# Patient Record
Sex: MALE | Race: WHITE | Employment: FULL TIME | URBAN - METROPOLITAN AREA
[De-identification: names, ages, dates, MRNs, and addresses within clinical notes are randomized per-mention and may not be internally consistent; named-entity substitution may affect disease eponyms.]

---

## 2018-03-21 PROBLEM — E78.00 HYPERCHOLESTEREMIA: Status: ACTIVE | Noted: 2018-03-21

## 2018-03-22 ENCOUNTER — OFFICE VISIT (OUTPATIENT)
Dept: CARDIOLOGY CLINIC | Age: 63
End: 2018-03-22

## 2018-03-22 VITALS
SYSTOLIC BLOOD PRESSURE: 158 MMHG | HEIGHT: 72 IN | HEART RATE: 87 BPM | BODY MASS INDEX: 25.03 KG/M2 | RESPIRATION RATE: 18 BRPM | OXYGEN SATURATION: 98 % | DIASTOLIC BLOOD PRESSURE: 70 MMHG | WEIGHT: 184.8 LBS

## 2018-03-22 DIAGNOSIS — Z82.49 FAMILY HISTORY OF PREMATURE CAD: ICD-10-CM

## 2018-03-22 DIAGNOSIS — E78.00 HYPERCHOLESTEREMIA: Primary | ICD-10-CM

## 2018-03-22 RX ORDER — ROSUVASTATIN CALCIUM 20 MG/1
20 TABLET, COATED ORAL
Qty: 30 TAB | Refills: 11 | Status: SHIPPED | OUTPATIENT
Start: 2018-03-22 | End: 2018-05-09 | Stop reason: SDUPTHER

## 2018-03-22 RX ORDER — ASPIRIN 81 MG/1
TABLET ORAL DAILY
COMMUNITY
End: 2019-04-17 | Stop reason: ALTCHOICE

## 2018-03-22 RX ORDER — ROSUVASTATIN CALCIUM 20 MG/1
20 TABLET, COATED ORAL
COMMUNITY
End: 2018-03-22 | Stop reason: SDUPTHER

## 2018-03-22 RX ORDER — OMEPRAZOLE 20 MG/1
40 CAPSULE, DELAYED RELEASE ORAL
COMMUNITY
End: 2019-03-13 | Stop reason: SDUPTHER

## 2018-03-22 NOTE — PROGRESS NOTES
HISTORY OF PRESENT ILLNESS  Ladonna Ho is a 65 y.o. male     SUMMARY:   Problem List  Date Reviewed: 3/22/2018          Codes Class Noted    Hypercholesteremia ICD-10-CM: E78.00  ICD-9-CM: 272.0  3/21/2018    Overview Signed 3/21/2018 10:08 AM by Noelle Severs, MD     12/08 normal dobutamine cardiolyte stress test, lvef 59%                   Current Outpatient Prescriptions on File Prior to Visit   Medication Sig    pravastatin (PRAVACHOL) 40 mg tablet Take 1 Tab by mouth nightly.  hydrocortisone (ANUSOL-HC) 2.5 % rectal cream Insert  into rectum four (4) times daily.  sildenafil citrate (VIAGRA) 100 mg tablet Take 1 Tab by mouth as needed. No current facility-administered medications on file prior to visit. CARDIOLOGY STUDIES TO DATE:  12/08 normal dobutamine cardiolyte stress test, lvef 59%  3/18 calcium score 482, 77th percentile    Chief Complaint   Patient presents with    Cholesterol Problem     HPI :  Mr. Sherie Simon is a 65year-old referred by Dr. Gina Small for cardiac evaluation. His brother who has premature coronary disease is a patient of mine as well. He has white coat hypertension and a family history of two brothers with premature coronary disease. There is no history of diabetes and he has never smoked. Lipid profile earlier this month showed total cholesterol of 220, triglycerides of 113 and HDL of 55 and LDL of 143. His Pravastatin dose was doubled to 40 mg a day. He exercises on an elliptical or a bike for 30-40 minutes almost every day with no symptoms suggestive of angina or heart failure. He has some mild arthritic pain in his back and knee and occasional heartburn. His EKG today shows normal sinus rhythm with a single PVC and no significant ST-T wave changes, normal intervals.         CARDIAC ROS:   negative for chest pain, dyspnea, palpitations, syncope, orthopnea, paroxysmal nocturnal dyspnea, exertional chest pressure/discomfort, claudication, lower extremity edema    Family History   Problem Relation Age of Onset    Heart Disease Mother     Heart Disease Father     Heart Disease Brother     Heart Disease Brother        Past Medical History:   Diagnosis Date    Atypical chest pain     Hypercholesteremia     Hypertension     Reflux esophagitis        GENERAL ROS:  A comprehensive review of systems was negative except for that written in the HPI. Visit Vitals    /70 (BP 1 Location: Left arm)    Pulse 87    Resp 18    Ht 5' 11.5\" (1.816 m)    Wt 184 lb 12.8 oz (83.8 kg)    SpO2 98%    BMI 25.42 kg/m2       Wt Readings from Last 3 Encounters:   03/22/18 184 lb 12.8 oz (83.8 kg)   03/02/18 180 lb (81.6 kg)   05/10/17 180 lb (81.6 kg)            BP Readings from Last 3 Encounters:   03/22/18 158/70   03/02/18 139/80   12/29/15 139/75       PHYSICAL EXAM  General appearance: alert, cooperative, no distress, appears stated age  Neurologic: Alert and oriented X 3  Neck: supple, symmetrical, trachea midline, no adenopathy, no carotid bruit and no JVD  Lungs: clear to auscultation bilaterally  Heart: regular rate and rhythm, S1, S2 normal, no murmur, click, rub or gallop  Abdomen: soft, non-tender. Bowel sounds normal. No masses,  no organomegaly  Extremities: extremities normal, atraumatic, no cyanosis or edema  Pulses: 2+ and symmetric    Lab Results   Component Value Date/Time    Cholesterol, total 220 (H) 12/29/2015 02:01 PM    Cholesterol, total 174 06/19/2013 02:41 PM    HDL Cholesterol 72 12/29/2015 02:01 PM    HDL Cholesterol 66 06/19/2013 02:41 PM    LDL, calculated 136 (H) 12/29/2015 02:01 PM    LDL, calculated 95 06/19/2013 02:41 PM    Triglyceride 59 12/29/2015 02:01 PM    Triglyceride 66 06/19/2013 02:41 PM     ASSESSMENT  Mr. Malick Richard has multiple risk factors and very abnormal calcium score. Fortunately, he exercises regularly and we talked about the importance of that going forward.   In the meantime, we are going to arrange for him to have a stress echocardiogram and I am going to put him on Crestor 20 mg a day with follow-up blood work in about six weeks aimed at lowering his LDL down to around 70 or less. We talked about symptoms that would prompt an urgent return visit here or a call to 911. current treatment plan is effective, no change in therapy  lab results and schedule of future lab studies reviewed with patient  reviewed diet, exercise and weight control    Encounter Diagnoses   Name Primary?  Hypercholesteremia Yes    Family history of premature CAD      Orders Placed This Encounter    AMB POC EKG ROUTINE W/ 12 LEADS, INTER & REP    omeprazole (PRILOSEC) 20 mg capsule       Follow-up Disposition:  Return in about 4 weeks (around 4/19/2018).     Dotty Romero MD  3/22/2018

## 2018-03-22 NOTE — MR AVS SNAPSHOT
[address] [facility] [address] [place] 
[phone] Patient: Abrahan Singleton MRN: [id number] :1955 Visit Information Date & Time Provider Department Dept. Phone Encounter #  
 3/22/2018  1:40 PM Ranjan Todd MD [facility] [phone] [id number] Follow-up Instructions Return in about 4 weeks (around 2018). Your Appointments 4/3/2018  2:00 PM  
VASCULAR TEST with VASCULAR, GERARDO [facility] ([place] SCHEDULING) Appt Note: stress echo for high cholesterol, high calcium score, family hx per Dr. Noah Luther (Dr. Noah Luther to review while pt in offc) [address] [place] [address], [address] [place] [address]  
[phone]  
  
   
 [address] [place] [address]  [address]  
  
    
 4/3/2018  2:00 PM  
STRESS ECHOCARDIOGRAMS with Abelardo Bansal [facility] ([place] SCHEDULING) Appt Note: stress echo for high cholesterol, high calcium score, family hx per Dr. Noah Luther (Dr. Noah Luther to review while pt in offc) [address] [place] [address], [address] [place] [address]  
[address] [facility]  
  
    
 3/26/2019  1:00 PM  
ESTABLISHED PATIENT with Ranjan Todd MD  
[facility] ([address] [facility]) Appt Note: 1 yr f/u per Dr. Subhash Persaud [address]  [address] [place] [address]  
[address],[facility] [address] [id number] Upcoming Health Maintenance Date Due Hepatitis C Screening 1955 DTaP/Tdap/Td series (1 - Tdap) 1977 FOBT Q 1 YEAR AGE 50-75 2006 Allergies as of 3/22/2018  Review Complete On: 3/22/2018 By: Ranjan Todd MD  
 No Known Allergies Current Immunizations  Never Reviewed No immunizations on file. Not reviewed this visit You Were Diagnosed With   
  
 Codes Comments Hypercholesteremia    -  Primary ICD-10-CM: E78.00 ICD-9-CM: 272.0 Family history of premature CAD     ICD-10-CM: Z82.49 
ICD-9-CM: V17.3 Vitals BP Pulse Resp Height(growth percentile) Weight(growth percentile) SpO2  
 158/70 (BP 1 Location: Left arm) 87 18 5' 11.5\" (1.816 m) 184 lb 12.8 oz (83.8 kg) 98% BMI Smoking Status 25.42 kg/m2 Never Smoker Vitals History BMI and BSA Data Body Mass Index Body Surface Area  
 25.42 kg/m 2 2.06 m 2 Preferred Pharmacy Pharmacy Name Phone [facility], [address] Your Updated Medication List  
  
   
This list is accurate as of 3/22/18  3:55 PM.  Always use your most recent med list.  
  
  
  
  
 aspirin delayed-release 81 mg tablet Take  by mouth daily. hydrocortisone 2.5 % rectal cream  
Commonly known as:  ANUSOL-HC Insert  into rectum four (4) times daily. pravastatin 40 mg tablet Commonly known as:  PRAVACHOL Take 1 Tab by mouth nightly. PriLOSEC 20 mg capsule Generic drug:  omeprazole Take 40 mg by mouth nightly. rosuvastatin 20 mg tablet Commonly known as:  CRESTOR Take 1 Tab by mouth nightly. sildenafil citrate 100 mg tablet Commonly known as:  VIAGRA Take 1 Tab by mouth as needed. Prescriptions Sent to Pharmacy Refills  
 rosuvastatin (CRESTOR) 20 mg tablet 11 Sig: Take 1 Tab by mouth nightly. Class: Normal  
 Pharmacy: [address] [place] [address], [address] [place] [address] Ph #: [phone] Route: Oral  
  
We Performed the Following AMB POC EKG ROUTINE W/ 12 LEADS, INTER & REP [51687 CPT(R)] LIPID PANEL [20256 CPT(R)] METABOLIC PANEL, COMPREHENSIVE [86128 CPT(R)] Follow-up Instructions Return in about 4 weeks (around 4/19/2018). To-Do List   
 03/22/2018 ECHO:  ECHO TTE STRESS EXRCSE COMP W OR WO CONTR Introducing [facility]! Dear Sue Garcia: 
Thank you for requesting a GeeYee account. Our records indicate that you already have an active GeeYee account. You can access your account anytime at [email or web address] Did you know that you can access your hospital and ER discharge instructions at any time in GeeYee? You can also review all of your test results from your hospital stay or ER visit. Additional Information If you have questions, please visit the Frequently Asked Questions section of the GeeYee website at [email or web address] Remember, GeeYee is NOT to be used for urgent needs. For medical emergencies, dial 911. Now available from your iPhone and Android! Please provide this summary of care documentation to your next provider. Your primary care clinician is listed as Nestor Zhu. If you have any questions after today's visit, please call [phone].

## 2018-04-03 ENCOUNTER — CLINICAL SUPPORT (OUTPATIENT)
Dept: CARDIOLOGY CLINIC | Age: 63
End: 2018-04-03

## 2018-04-03 DIAGNOSIS — E78.00 HYPERCHOLESTEREMIA: ICD-10-CM

## 2018-04-03 DIAGNOSIS — Z82.49 FAMILY HISTORY OF PREMATURE CAD: ICD-10-CM

## 2018-04-03 DIAGNOSIS — R93.1 ELEVATED CORONARY ARTERY CALCIUM SCORE: Primary | ICD-10-CM

## 2018-05-08 ENCOUNTER — TELEPHONE (OUTPATIENT)
Dept: CARDIOLOGY CLINIC | Age: 63
End: 2018-05-08

## 2018-05-08 NOTE — TELEPHONE ENCOUNTER
Patient is calling with a few questions regarding some testing that he is considering.  Patient stated that he has some questions regarding whether or not he needs to see a pulmonologist.  Phone [phone]  Marylou Jimenez

## 2018-05-09 ENCOUNTER — TELEPHONE (OUTPATIENT)
Dept: CARDIOLOGY CLINIC | Age: 63
End: 2018-05-09

## 2018-05-09 DIAGNOSIS — E78.00 HYPERCHOLESTEREMIA: ICD-10-CM

## 2018-05-09 DIAGNOSIS — E78.00 HYPERCHOLESTEREMIA: Primary | ICD-10-CM

## 2018-05-09 DIAGNOSIS — Z82.49 FAMILY HISTORY OF PREMATURE CAD: ICD-10-CM

## 2018-05-09 LAB
ALBUMIN SERPL-MCNC: 4.3 G/DL (ref 3.6–4.8)
ALBUMIN/GLOB SERPL: 1.8 {RATIO} (ref 1.2–2.2)
ALP SERPL-CCNC: 48 IU/L (ref 39–117)
ALT SERPL-CCNC: 24 IU/L (ref 0–44)
AST SERPL-CCNC: 25 IU/L (ref 0–40)
BILIRUB SERPL-MCNC: 0.5 MG/DL (ref 0–1.2)
BUN SERPL-MCNC: 16 MG/DL (ref 8–27)
BUN/CREAT SERPL: 16 (ref 10–24)
CALCIUM SERPL-MCNC: 9.2 MG/DL (ref 8.6–10.2)
CHLORIDE SERPL-SCNC: 104 MMOL/L (ref 96–106)
CHOLEST SERPL-MCNC: 176 MG/DL (ref 100–199)
CO2 SERPL-SCNC: 25 MMOL/L (ref 18–29)
CREAT SERPL-MCNC: 0.97 MG/DL (ref 0.76–1.27)
GFR SERPLBLD CREATININE-BSD FMLA CKD-EPI: 84 ML/MIN/1.73
GFR SERPLBLD CREATININE-BSD FMLA CKD-EPI: 97 ML/MIN/1.73
GLOBULIN SER CALC-MCNC: 2.4 G/DL (ref 1.5–4.5)
GLUCOSE SERPL-MCNC: 96 MG/DL (ref 65–99)
HDLC SERPL-MCNC: 64 MG/DL
INTERPRETATION, 910389: NORMAL
LDLC SERPL CALC-MCNC: 100 MG/DL (ref 0–99)
POTASSIUM SERPL-SCNC: 4.7 MMOL/L (ref 3.5–5.2)
PROT SERPL-MCNC: 6.7 G/DL (ref 6–8.5)
SODIUM SERPL-SCNC: 142 MMOL/L (ref 134–144)
TRIGL SERPL-MCNC: 62 MG/DL (ref 0–149)
VLDLC SERPL CALC-MCNC: 12 MG/DL (ref 5–40)

## 2018-05-09 NOTE — TELEPHONE ENCOUNTER
----- Message from Ori Cheek MD sent at 5/9/2018 11:22 AM EDT -----  Much better but still not ideal. Double crestor and repeat labs in 6wks

## 2018-05-09 NOTE — TELEPHONE ENCOUNTER
Left message on voicemail. Will notify patient of Dr. Pete Hammer message. Will offer to mail lab slip or leave one at the . Will see if he needs rx for crestor 40 mg or if he has enough of the 20 mg tabs to double up on for now. I changed sode to 40 mg in med rec, but did not send it to pharmacy.

## 2018-05-09 NOTE — TELEPHONE ENCOUNTER
Patient called back. Verified patient's identity with two identifiers. Patient states he is still having symptoms where he feels a skipped breath or \"skipped beat\" every once in a while. He asked if he should go see a pulmonologist. He already has plans for a chest xray and to make appointment. He has a family hx of pulmonary problems. I told him I do not see why it would be a bad idea and said he may want to consult with PCP for referral. Patient verbalizes understanding and denies further questions or concerns.

## 2018-05-10 RX ORDER — ROSUVASTATIN CALCIUM 40 MG/1
40 TABLET, COATED ORAL
Qty: 90 TAB | Refills: 2 | Status: SHIPPED | OUTPATIENT
Start: 2018-05-10 | End: 2019-02-14 | Stop reason: SDUPTHER

## 2018-05-10 NOTE — TELEPHONE ENCOUNTER
Patient called. Verified patient's identity with two identifiers. Notified patient of message below. Sending rx for crestor 40 to pharmacy and mailing lab slip for him to have labs rechecked in 6 weeks. Patient verbalizes understanding and denies further questions or concerns.

## 2019-02-14 DIAGNOSIS — E78.00 HYPERCHOLESTEREMIA: ICD-10-CM

## 2019-02-14 DIAGNOSIS — Z82.49 FAMILY HISTORY OF PREMATURE CAD: ICD-10-CM

## 2019-02-14 RX ORDER — ROSUVASTATIN CALCIUM 40 MG/1
TABLET, COATED ORAL
Qty: 90 TAB | Refills: 1 | Status: SHIPPED | OUTPATIENT
Start: 2019-02-14 | End: 2019-04-17 | Stop reason: SDUPTHER

## 2019-04-17 ENCOUNTER — OFFICE VISIT (OUTPATIENT)
Dept: CARDIOLOGY CLINIC | Age: 64
End: 2019-04-17

## 2019-04-17 VITALS
HEIGHT: 71 IN | WEIGHT: 185 LBS | BODY MASS INDEX: 25.9 KG/M2 | DIASTOLIC BLOOD PRESSURE: 62 MMHG | HEART RATE: 82 BPM | SYSTOLIC BLOOD PRESSURE: 162 MMHG

## 2019-04-17 DIAGNOSIS — E78.00 HYPERCHOLESTEREMIA: ICD-10-CM

## 2019-04-17 DIAGNOSIS — Z82.49 FAMILY HISTORY OF PREMATURE CAD: ICD-10-CM

## 2019-04-17 DIAGNOSIS — R93.1 ELEVATED CORONARY ARTERY CALCIUM SCORE: Primary | ICD-10-CM

## 2019-04-17 RX ORDER — ROSUVASTATIN CALCIUM 40 MG/1
TABLET, COATED ORAL
Qty: 90 TAB | Refills: 3 | Status: SHIPPED | OUTPATIENT
Start: 2019-04-17 | End: 2020-06-09

## 2019-04-17 NOTE — PROGRESS NOTES
HISTORY OF PRESENT ILLNESS  Dagoberto Childers is a 59 y.o. male     SUMMARY:   Problem List  Date Reviewed: 4/16/2019          Codes Class Noted    Hypercholesteremia ICD-10-CM: E78.00  ICD-9-CM: 272.0  3/21/2018    Overview Signed 3/21/2018 10:08 AM by Tanya Gilman MD     12/08 normal dobutamine cardiolyte stress test, lvef 59%                   Current Outpatient Medications on File Prior to Visit   Medication Sig    omeprazole (PRILOSEC) 20 mg capsule 1 cap BID    hydrocortisone (ANUSOL-HC) 2.5 % rectal cream Insert  into rectum four (4) times daily.  sildenafil citrate (VIAGRA) 100 mg tablet Take 1 Tab by mouth as needed. No current facility-administered medications on file prior to visit. CARDIOLOGY STUDIES TO DATE:  12/08 normal dobutamine cardiolyte stress test, lvef 59%  3/18 calcium score 482, 77th percentile  4/18 normal stress echo    Chief Complaint   Patient presents with    Cholesterol Problem     HPI :  Mr. Felix Collazo is doing well. He is still exercising about 30 minutes a day five or six days a week with no worrisome symptoms. His systolic blood pressure is slightly elevated here in the office today, but he monitors it at home and it typically runs in the 120s. Recent LDL cholesterol was 106. He had one episode a few months ago where he drank a bunch of caffeine on an empty stomach and his heart fluttered for a minute or two and he felt really strange, but nothing before or since.         CARDIAC ROS:   negative for chest pain, dyspnea, palpitations, syncope, orthopnea, paroxysmal nocturnal dyspnea, exertional chest pressure/discomfort, claudication, lower extremity edema    Family History   Problem Relation Age of Onset    Heart Disease Mother     Heart Disease Father     Heart Disease Brother         premature    Heart Disease Brother         premature       Past Medical History:   Diagnosis Date    Atypical chest pain     Hypercholesteremia     Hypertension     Reflux esophagitis        GENERAL ROS:  A comprehensive review of systems was negative except for that written in the HPI. Visit Vitals  /62   Pulse 82   Ht 5' 11\" (1.803 m)   Wt 185 lb (83.9 kg)   BMI 25.80 kg/m²       Wt Readings from Last 3 Encounters:   04/17/19 185 lb (83.9 kg)   03/13/19 180 lb (81.6 kg)   12/04/18 182 lb (82.6 kg)            BP Readings from Last 3 Encounters:   04/17/19 162/62   03/13/19 136/74   12/04/18 150/80       PHYSICAL EXAM  General appearance: alert, cooperative, no distress, appears stated age  Neurologic: Alert and oriented X 3  Neck: supple, symmetrical, trachea midline, no adenopathy, no carotid bruit and no JVD  Lungs: clear to auscultation bilaterally  Heart: regular rate and rhythm, S1, S2 normal, no murmur, click, rub or gallop  Extremities: extremities normal, atraumatic, no cyanosis or edema    Lab Results   Component Value Date/Time    Cholesterol, total 176 05/08/2018 09:12 AM    Cholesterol, total 220 (H) 12/29/2015 02:01 PM    Cholesterol, total 174 06/19/2013 02:41 PM    HDL Cholesterol 64 05/08/2018 09:12 AM    HDL Cholesterol 72 12/29/2015 02:01 PM    HDL Cholesterol 66 06/19/2013 02:41 PM    LDL, calculated 100 (H) 05/08/2018 09:12 AM    LDL, calculated 136 (H) 12/29/2015 02:01 PM    LDL, calculated 95 06/19/2013 02:41 PM    Triglyceride 62 05/08/2018 09:12 AM    Triglyceride 59 12/29/2015 02:01 PM    Triglyceride 66 06/19/2013 02:41 PM     ASSESSMENT  Mr. Mine Anderson is stable, asymptomatic and well compensated on a good medical regimen and needs no cardiac testing at this time. current treatment plan is effective, no change in therapy  lab results and schedule of future lab studies reviewed with patient  reviewed diet, exercise and weight control    Encounter Diagnoses   Name Primary?     Elevated coronary artery calcium score Yes    Hypercholesteremia     Family history of premature CAD      Orders Placed This Encounter    rosuvastatin (CRESTOR) 40 mg tablet       Follow-up and Dispositions    · Return in about 1 year (around 4/17/2020).          Sathya Fernandez MD  4/17/2019

## 2019-04-17 NOTE — PROGRESS NOTES
Chief Complaint   Patient presents with    Cholesterol Problem     Verified patient with two types of identifiers. Verified medications with the patient.     Verified patient's pharmacy

## 2020-04-03 ENCOUNTER — TELEPHONE (OUTPATIENT)
Dept: CARDIOLOGY CLINIC | Age: 65
End: 2020-04-03

## 2020-04-03 NOTE — TELEPHONE ENCOUNTER
LM to rs 4/17/20 with Dr. Sharee Saleh, needs virtual visit 4/8/20 in the morning must create a slot for this appt or rs for July.

## 2020-06-09 DIAGNOSIS — Z82.49 FAMILY HISTORY OF PREMATURE CAD: ICD-10-CM

## 2020-06-09 DIAGNOSIS — E78.00 HYPERCHOLESTEREMIA: ICD-10-CM

## 2020-06-09 RX ORDER — ROSUVASTATIN CALCIUM 40 MG/1
TABLET, COATED ORAL
Qty: 90 TAB | Refills: 0 | Status: SHIPPED | OUTPATIENT
Start: 2020-06-09 | End: 2020-09-21 | Stop reason: SDUPTHER

## 2020-06-09 NOTE — TELEPHONE ENCOUNTER
Requested Prescriptions     Signed Prescriptions Disp Refills    rosuvastatin (CRESTOR) 40 mg tablet 90 Tab 0     Sig: TAKE 1 TABLET BY MOUTH NIGHTLY     Authorizing Provider: Gladis Rangel     Ordering User: Adithya Barrera    Per Dr. Lm Robles verbal orders

## 2020-08-14 ENCOUNTER — OFFICE VISIT (OUTPATIENT)
Dept: CARDIOLOGY CLINIC | Age: 65
End: 2020-08-14
Payer: COMMERCIAL

## 2020-08-14 VITALS
SYSTOLIC BLOOD PRESSURE: 120 MMHG | WEIGHT: 175 LBS | HEIGHT: 71 IN | DIASTOLIC BLOOD PRESSURE: 60 MMHG | RESPIRATION RATE: 20 BRPM | BODY MASS INDEX: 24.5 KG/M2 | OXYGEN SATURATION: 98 % | HEART RATE: 78 BPM

## 2020-08-14 DIAGNOSIS — Z82.49 FAMILY HISTORY OF PREMATURE CAD: ICD-10-CM

## 2020-08-14 DIAGNOSIS — R93.1 ELEVATED CORONARY ARTERY CALCIUM SCORE: ICD-10-CM

## 2020-08-14 DIAGNOSIS — R93.1 ELEVATED CORONARY ARTERY CALCIUM SCORE: Primary | ICD-10-CM

## 2020-08-14 DIAGNOSIS — E78.00 HYPERCHOLESTEREMIA: ICD-10-CM

## 2020-08-14 PROCEDURE — 99213 OFFICE O/P EST LOW 20 MIN: CPT | Performed by: SPECIALIST

## 2020-08-14 NOTE — PROGRESS NOTES
HISTORY OF PRESENT ILLNESS  Syeda Davies is a 62 y.o. male     SUMMARY:   Problem List  Date Reviewed: 2020          Codes Class Noted    Hypercholesteremia ICD-10-CM: E78.00  ICD-9-CM: 272.0  3/21/2018    Overview Signed 3/21/2018 10:08 AM by Trent Boone MD      normal dobutamine cardiolyte stress test, lvef 59%                   Current Outpatient Medications on File Prior to Visit   Medication Sig    rosuvastatin (CRESTOR) 40 mg tablet TAKE 1 TABLET BY MOUTH NIGHTLY    omeprazole (PRILOSEC) 20 mg capsule TAKE 1 CAPSULE BY MOUTH TWICE A DAY    sildenafil citrate (VIAGRA) 100 mg tablet Take 1 Tab by mouth as needed.  hydrocortisone (ANUSOL-HC) 2.5 % rectal cream Insert  into rectum four (4) times daily. No current facility-administered medications on file prior to visit. CARDIOLOGY STUDIES TO DATE:   normal dobutamine cardiolyte stress test, lvef 59%  3/18 calcium score 482, 77th percentile   normal stress echo    Chief Complaint   Patient presents with    Follow-up     HPI :  He is doing okay. A couple months ago his brother who is also a patient of mine was found  slumped over steering wheel in his car. Is uncertain what happened. Over the last month or 2 he is developed some dependent lower extremity edema. 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 He also has not been restricting his sodium intake. He is walking about an hour a day without any worrisome symptoms and recent lipid profile looked great.   CARDIAC ROS:   negative for chest pain, dyspnea, palpitations, syncope, orthopnea, paroxysmal nocturnal dyspnea, exertional chest pressure/discomfort, claudication    Family History   Problem Relation Age of Onset    Heart Disease Mother     Heart Disease Father     Heart Disease Brother         premature    Heart Disease Brother         premature       Past Medical History:   Diagnosis Date    Atypical chest pain     Hypercholesteremia     Hypertension     Reflux esophagitis        GENERAL ROS:  A comprehensive review of systems was negative except for that written in the HPI. Visit Vitals  /60 (BP 1 Location: Left arm, BP Patient Position: Sitting)   Pulse 78   Resp 20   Ht 5' 11\" (1.803 m)   Wt 175 lb (79.4 kg)   SpO2 98%   BMI 24.41 kg/m²       Wt Readings from Last 3 Encounters:   08/14/20 175 lb (79.4 kg)   08/07/20 176 lb (79.8 kg)   07/08/20 180 lb (81.6 kg)            BP Readings from Last 3 Encounters:   08/14/20 120/60   08/07/20 138/80   07/08/20 138/70       PHYSICAL EXAM  General appearance: alert, cooperative, no distress, appears stated age  Neurologic: Alert and oriented X 3  Neck: supple, symmetrical, trachea midline, no adenopathy, no carotid bruit and no JVD  Lungs: clear to auscultation bilaterally  Heart: regular rate and rhythm, S1, S2 normal, no murmur, click, rub or gallop  Extremities: edema tr    Lab Results   Component Value Date/Time    Cholesterol, total 179 07/08/2020 08:56 AM    Cholesterol, total 176 05/08/2018 09:12 AM    Cholesterol, total 220 (H) 12/29/2015 02:01 PM    Cholesterol, total 174 06/19/2013 02:41 PM    HDL Cholesterol 69 07/08/2020 08:56 AM    HDL Cholesterol 64 05/08/2018 09:12 AM    HDL Cholesterol 72 12/29/2015 02:01 PM    HDL Cholesterol 66 06/19/2013 02:41 PM    LDL, calculated 97 07/08/2020 08:56 AM    LDL, calculated 100 (H) 05/08/2018 09:12 AM    LDL, calculated 136 (H) 12/29/2015 02:01 PM    LDL, calculated 95 06/19/2013 02:41 PM    Triglyceride 64 07/08/2020 08:56 AM    Triglyceride 62 05/08/2018 09:12 AM    Triglyceride 59 12/29/2015 02:01 PM    Triglyceride 66 06/19/2013 02:41 PM     ASSESSMENT :      He is obviously concerned about his brother's history and he has other family members with coronary disease. Is been over 2 years since we have done any provocative testing so organ to set him up for a stress echo. We talked about some remedies for his dependent edema and I reassured him very unlikely it was cardiac related. current treatment plan is effective, no change in therapy  lab results and schedule of future lab studies reviewed with patient  reviewed diet, exercise and weight control    Encounter Diagnoses   Name Primary?  Elevated coronary artery calcium score Yes    Hypercholesteremia     Family history of premature CAD      No orders of the defined types were placed in this encounter. Follow-up and Dispositions    · Return in about 1 year (around 8/14/2021). Donnie Alvarez MD  8/14/2020  Please note that this dictation was completed with IMVU, the [employer] voice recognition software. Quite often unanticipated grammatical, syntax, homophones, and other interpretive errors are inadvertently transcribed by the computer software. Please disregard these errors. Please excuse any errors that have escaped final proofreading. Thank you.

## 2020-08-14 NOTE — PROGRESS NOTES
Patient saw Dr. Marcelino Cuevas in office today and a stress echo was ordered. He had to leave so did not have time to schedule it. He will call office to schedule stress echo. Dx HTN, hyperlipidemia, elevated calcium score, family hx per Dr. Marysol DOSHI. Patient was given instructions, including to have covid 19 testing 5 days prior at [address] [place] [address].

## 2020-09-21 DIAGNOSIS — E78.00 HYPERCHOLESTEREMIA: ICD-10-CM

## 2020-09-21 DIAGNOSIS — Z82.49 FAMILY HISTORY OF PREMATURE CAD: ICD-10-CM

## 2020-09-21 RX ORDER — ROSUVASTATIN CALCIUM 40 MG/1
40 TABLET, COATED ORAL
Qty: 90 TAB | Refills: 3 | Status: SHIPPED | OUTPATIENT
Start: 2020-09-21 | End: 2021-08-24

## 2020-09-21 NOTE — TELEPHONE ENCOUNTER
Requested Prescriptions     Signed Prescriptions Disp Refills    rosuvastatin (CRESTOR) 40 mg tablet 90 Tab 3     Sig: Take 1 Tab by mouth nightly.      Authorizing Provider: Gray Reasoner     Ordering User: Bud Santiago    Per Dr. Azam Carver verbal orders

## 2021-04-21 ENCOUNTER — OFFICE VISIT (OUTPATIENT)
Dept: CARDIOLOGY CLINIC | Age: 66
End: 2021-04-21
Payer: COMMERCIAL

## 2021-04-21 VITALS
RESPIRATION RATE: 13 BRPM | OXYGEN SATURATION: 98 % | HEART RATE: 80 BPM | SYSTOLIC BLOOD PRESSURE: 130 MMHG | DIASTOLIC BLOOD PRESSURE: 60 MMHG | HEIGHT: 71 IN | WEIGHT: 169.2 LBS | BODY MASS INDEX: 23.69 KG/M2

## 2021-04-21 DIAGNOSIS — E78.00 HYPERCHOLESTEREMIA: Primary | ICD-10-CM

## 2021-04-21 DIAGNOSIS — Z82.49 FAMILY HISTORY OF PREMATURE CAD: ICD-10-CM

## 2021-04-21 DIAGNOSIS — R93.1 ELEVATED CORONARY ARTERY CALCIUM SCORE: ICD-10-CM

## 2021-04-21 PROCEDURE — 99213 OFFICE O/P EST LOW 20 MIN: CPT | Performed by: SPECIALIST

## 2021-08-10 ENCOUNTER — OFFICE VISIT (OUTPATIENT)
Dept: CARDIOLOGY CLINIC | Age: 66
End: 2021-08-10
Payer: COMMERCIAL

## 2021-08-10 VITALS
RESPIRATION RATE: 20 BRPM | HEART RATE: 64 BPM | OXYGEN SATURATION: 99 % | SYSTOLIC BLOOD PRESSURE: 138 MMHG | WEIGHT: 171 LBS | HEIGHT: 71 IN | DIASTOLIC BLOOD PRESSURE: 62 MMHG | BODY MASS INDEX: 23.94 KG/M2

## 2021-08-10 DIAGNOSIS — Z82.49 FAMILY HISTORY OF PREMATURE CAD: ICD-10-CM

## 2021-08-10 DIAGNOSIS — R93.1 ELEVATED CORONARY ARTERY CALCIUM SCORE: ICD-10-CM

## 2021-08-10 DIAGNOSIS — E78.00 HYPERCHOLESTEREMIA: Primary | ICD-10-CM

## 2021-08-10 PROCEDURE — 99213 OFFICE O/P EST LOW 20 MIN: CPT | Performed by: SPECIALIST

## 2021-08-10 RX ORDER — ZINC GLUCONATE 10 MG
250 LOZENGE ORAL DAILY
COMMUNITY

## 2021-08-10 NOTE — PROGRESS NOTES
HISTORY OF PRESENT ILLNESS  Doroteo Curiel is a 60 y.o. male     SUMMARY:   Problem List  Date Reviewed: 8/10/2021        Codes Class Noted    Hypercholesteremia ICD-10-CM: E78.00  ICD-9-CM: 272.0  3/21/2018    Overview Signed 3/21/2018 10:08 AM by Xochitl Baez MD     12/08 normal dobutamine cardiolyte stress test, lvef 59%                   Current Outpatient Medications on File Prior to Visit   Medication Sig    magnesium 250 mg tab Take 250 mg by mouth daily.  omeprazole (PRILOSEC) 20 mg capsule TAKE 1 CAPSULE BY MOUTH TWICE DAILY    rosuvastatin (CRESTOR) 40 mg tablet Take 1 Tab by mouth nightly. No current facility-administered medications on file prior to visit. CARDIOLOGY STUDIES TO DATE:  12/08 normal dobutamine cardiolyte stress test, lvef 59%  3/18 calcium score 482, 77th percentile  4/18 normal stress echo   2016 carotid dopplers mild bilateral plaque   7/19 carotid dopplers unchanged      Chief Complaint   Patient presents with    Follow-up     1 year     HPI :  He is doing great from a cardiac standpoint with no worrisome symptoms. He is back in the gym exercising and most of the trouble he had in his left leg improved with extensive physical therapy. He still has some cramping in his foot at night so has been taking supplemental magnesium. Lipid profile earlier this month looked great. They are planning to go to [facility] to a family members [place] later this month and then a [place] trip in September.   CARDIAC ROS:   negative for chest pain, dyspnea, palpitations, syncope, orthopnea, paroxysmal nocturnal dyspnea, exertional chest pressure/discomfort, claudication, lower extremity edema    Family History   Problem Relation Age of Onset    Heart Disease Mother     Heart Disease Father     Heart Disease Brother         premature    Heart Disease Brother         premature       Past Medical History:   Diagnosis Date    Atypical chest pain     Hypercholesteremia  Hypertension     Reflux esophagitis        GENERAL ROS:  A comprehensive review of systems was negative except for that written in the HPI.     Visit Vitals  /62 (BP 1 Location: Left upper arm, BP Patient Position: Sitting, BP Cuff Size: Adult)   Pulse 64   Resp 20   Ht 5' 11\" (1.803 m)   Wt 171 lb (77.6 kg)   SpO2 99%   BMI 23.85 kg/m²       Wt Readings from Last 3 Encounters:   08/10/21 171 lb (77.6 kg)   08/04/21 172 lb (78 kg)   04/21/21 169 lb 3.2 oz (76.7 kg)            BP Readings from Last 3 Encounters:   08/10/21 138/62   08/04/21 120/70   04/21/21 130/60       PHYSICAL EXAM  General appearance: alert, cooperative, no distress, appears stated age  Neurologic: Alert and oriented X 3  Neck: supple, symmetrical, trachea midline, no adenopathy, no carotid bruit and no JVD  Lungs: clear to auscultation bilaterally  Heart: regular rate and rhythm, S1, S2 normal, no murmur, click, rub or gallop  Extremities: extremities normal, atraumatic, no cyanosis or edema    Lab Results   Component Value Date/Time    Cholesterol, total 186 08/04/2021 02:20 PM    Cholesterol, total 179 07/08/2020 08:56 AM    Cholesterol, total 176 05/08/2018 09:12 AM    Cholesterol, total 220 (H) 12/29/2015 02:01 PM    Cholesterol, total 174 06/19/2013 02:41 PM    HDL Cholesterol 80 08/04/2021 02:20 PM    HDL Cholesterol 69 07/08/2020 08:56 AM    HDL Cholesterol 64 05/08/2018 09:12 AM    HDL Cholesterol 72 12/29/2015 02:01 PM    HDL Cholesterol 66 06/19/2013 02:41 PM    LDL, calculated 95 08/04/2021 02:20 PM    LDL, calculated 97 07/08/2020 08:56 AM    LDL, calculated 100 (H) 05/08/2018 09:12 AM    LDL, calculated 136 (H) 12/29/2015 02:01 PM    LDL, calculated 95 06/19/2013 02:41 PM    Triglyceride 60 08/04/2021 02:20 PM    Triglyceride 64 07/08/2020 08:56 AM    Triglyceride 62 05/08/2018 09:12 AM    Triglyceride 59 12/29/2015 02:01 PM    Triglyceride 66 06/19/2013 02:41 PM     ASSESSMENT :      He is stable and asymptomatic, well compensated on a good medical regimen. Because of his ongoing risk factors were going to reevaluate and risk stratify with a stress echo. current treatment plan is effective, no change in therapy  lab results and schedule of future lab studies reviewed with patient  reviewed diet, exercise and weight control    Encounter Diagnoses   Name Primary?  Hypercholesteremia Yes    Family history of premature CAD     Elevated coronary artery calcium score      Orders Placed This Encounter    magnesium 250 mg tab       Follow-up and Dispositions    · Return in about 6 months (around 2/10/2022). Moody Rouse MD  8/10/2021  Please note that this dictation was completed with [employer], the [employer] voice recognition software. Quite often unanticipated grammatical, syntax, homophones, and other interpretive errors are inadvertently transcribed by the computer software. Please disregard these errors. Please excuse any errors that have escaped final proofreading. Thank you.

## 2021-08-24 DIAGNOSIS — Z82.49 FAMILY HISTORY OF PREMATURE CAD: ICD-10-CM

## 2021-08-24 DIAGNOSIS — E78.00 HYPERCHOLESTEREMIA: ICD-10-CM

## 2021-08-24 RX ORDER — ROSUVASTATIN CALCIUM 40 MG/1
TABLET, COATED ORAL
Qty: 90 TABLET | Refills: 3 | Status: SHIPPED | OUTPATIENT
Start: 2021-08-24 | End: 2022-09-06

## 2021-08-24 NOTE — TELEPHONE ENCOUNTER
Requested Prescriptions     Signed Prescriptions Disp Refills    rosuvastatin (CRESTOR) 40 mg tablet 90 Tablet 3     Sig: TAKE 1 TABLET BY MOUTH EVERY DAY     Authorizing Provider: Adrianne Guido     Ordering User: Filiberto Eisenmenger    Per Dr. Asia Avilez verbal orders

## 2021-09-14 ENCOUNTER — TELEPHONE (OUTPATIENT)
Dept: CARDIOLOGY CLINIC | Age: 66
End: 2021-09-14

## 2021-09-20 NOTE — TELEPHONE ENCOUNTER
Patient needing a call back. He can't come in at 3pm tomorrow.  He would like to know if he can go back to 4pm on 9/21/21    [facility]

## 2021-09-21 ENCOUNTER — ANCILLARY PROCEDURE (OUTPATIENT)
Dept: CARDIOLOGY CLINIC | Age: 66
End: 2021-09-21
Payer: COMMERCIAL

## 2021-09-21 VITALS — WEIGHT: 171 LBS | BODY MASS INDEX: 23.94 KG/M2 | HEIGHT: 71 IN

## 2021-09-21 LAB
STRESS BASELINE DIAS BP: 70 MMHG
STRESS BASELINE HR: 72 BPM
STRESS BASELINE SYS BP: 160 MMHG
STRESS ESTIMATED WORKLOAD: 13.4 METS
STRESS EXERCISE DUR MIN: NORMAL
STRESS O2 SAT PEAK: 93 %
STRESS PEAK DIAS BP: 92 MMHG
STRESS PEAK SYS BP: 200 MMHG
STRESS PERCENT HR ACHIEVED: 115 %
STRESS POST PEAK HR: 179 BPM
STRESS RATE PRESSURE PRODUCT: NORMAL BPM*MMHG
STRESS ST DEPRESSION: 1 MM
STRESS TARGET HR: 156 BPM

## 2021-09-21 PROCEDURE — 93351 STRESS TTE COMPLETE: CPT | Performed by: SPECIALIST

## 2021-11-30 DIAGNOSIS — E78.00 HYPERCHOLESTEREMIA: ICD-10-CM

## 2021-11-30 NOTE — TELEPHONE ENCOUNTER
Patient is calling because he needs a refill for his omeprazole 20 mg. Patient would like to know if he can get a 60 day refill. Patient says he has a new insurance and it is called [employer] prescription drug plan [facility]. Phamacy confirmed. Patient is completely.     [phone]    Patient [phone]

## 2021-11-30 NOTE — TELEPHONE ENCOUNTER
pcp prescribed. Called pt. Raffy on vm stating omeprazole needs to be requested from his pcp. Also sent patient message in [place].

## 2021-12-01 RX ORDER — OMEPRAZOLE 20 MG/1
CAPSULE, DELAYED RELEASE ORAL
Qty: 180 CAPSULE | Refills: 3 | Status: SHIPPED | OUTPATIENT
Start: 2021-12-01

## 2022-03-18 PROBLEM — E78.00 HYPERCHOLESTEREMIA: Status: ACTIVE | Noted: 2018-03-21

## 2022-09-05 DIAGNOSIS — Z82.49 FAMILY HISTORY OF PREMATURE CAD: ICD-10-CM

## 2022-09-05 DIAGNOSIS — E78.00 HYPERCHOLESTEREMIA: ICD-10-CM

## 2022-09-06 RX ORDER — ROSUVASTATIN CALCIUM 40 MG/1
TABLET, COATED ORAL
Qty: 30 TABLET | Refills: 0 | Status: SHIPPED | OUTPATIENT
Start: 2022-09-06 | End: 2022-09-19 | Stop reason: SDUPTHER

## 2022-09-06 NOTE — TELEPHONE ENCOUNTER
Requested Prescriptions     Signed Prescriptions Disp Refills    rosuvastatin (CRESTOR) 40 mg tablet 30 Tablet 0     Sig: TAKE 1 TABLET BY MOUTH EVERY DAY. Need repeat labs and appointment OR request from pcp.      Authorizing Provider: Meggan Platt     Ordering User: Shahida Villarrealing    Per Dr. Pierre Farah verbal orders

## 2022-09-19 DIAGNOSIS — Z82.49 FAMILY HISTORY OF PREMATURE CAD: ICD-10-CM

## 2022-09-19 DIAGNOSIS — E78.00 HYPERCHOLESTEREMIA: ICD-10-CM

## 2022-09-19 RX ORDER — ROSUVASTATIN CALCIUM 40 MG/1
TABLET, COATED ORAL
Qty: 30 TABLET | Refills: 0 | Status: SHIPPED | OUTPATIENT
Start: 2022-09-19 | End: 2022-11-02

## 2022-09-19 NOTE — TELEPHONE ENCOUNTER
Requested Prescriptions     Signed Prescriptions Disp Refills    rosuvastatin (CRESTOR) 40 mg tablet 30 Tablet 0     Sig: TAKE 1 TABLET BY MOUTH EVERY DAY. Need fasting labs please. Authorizing Provider: Tyler Jacobs     Ordering User: Claritza Grimaldo    Per Dr. Za Brady verbal orders     Future Appointments   Date Time Provider Ninfa Gloria   10/12/2022  9:45 AM Richard Williamson MD [facility] SCHED   10/18/2022  3:20 PM MD [facility]      Looks like pt sees pcp prior to follow up appointment with Dr. Padmini Selby so hopefully will get labs. Placed lab order and sent pt [employer] message.

## 2022-09-19 NOTE — TELEPHONE ENCOUNTER
Patient is calling to request a refill for Crestor 40mg. Please Advise.      Pharmacy Verified     [phone]

## 2022-09-29 ENCOUNTER — HOSPITAL ENCOUNTER (OUTPATIENT)
Dept: VASCULAR SURGERY | Age: 67
Discharge: HOME | End: 2022-09-29
Attending: FAMILY MEDICINE
Payer: MEDICARE

## 2022-09-29 DIAGNOSIS — I65.23 CAROTID ARTERY PLAQUE, BILATERAL: ICD-10-CM

## 2022-09-29 PROCEDURE — 93880 EXTRACRANIAL BILAT STUDY: CPT

## 2022-10-03 LAB
LEFT CCA DIST DIAS: 20.4 CM/S
LEFT CCA DIST SYS: 73.2 CM/S
LEFT CCA PROX DIAS: 28.5 CM/S
LEFT CCA PROX SYS: 96.1 CM/S
LEFT ECA DIAS: 9.3 CM/S
LEFT ECA SYS: 100.2 CM/S
LEFT ICA DIST DIAS: 30.2 CM/S
LEFT ICA DIST SYS: 76.1 CM/S
LEFT ICA MID DIAS: 29 CM/S
LEFT ICA MID SYS: 70.7 CM/S
LEFT ICA PROX DIAS: 29 CM/S
LEFT ICA PROX SYS: 74.4 CM/S
LEFT ICA/CCA SYS: 1 NO UNITS
LEFT VERTEBRAL DIAS: 11.9 CM/S
LEFT VERTEBRAL SYS: 36.3 CM/S
RIGHT CCA DIST DIAS: 22.9 CM/S
RIGHT CCA DIST SYS: 79 CM/S
RIGHT CCA PROX DIAS: 21.6 CM/S
RIGHT CCA PROX SYS: 77.7 CM/S
RIGHT ECA DIAS: 16.4 CM/S
RIGHT ECA SYS: 98.5 CM/S
RIGHT ICA DIST DIAS: 36 CM/S
RIGHT ICA DIST SYS: 90.7 CM/S
RIGHT ICA MID DIAS: 30.7 CM/S
RIGHT ICA MID SYS: 72.4 CM/S
RIGHT ICA PROX DIAS: 22.9 CM/S
RIGHT ICA PROX SYS: 65.9 CM/S
RIGHT ICA/CCA SYS: 1.1 NO UNITS
RIGHT VERTEBRAL DIAS: 19.5 CM/S
RIGHT VERTEBRAL SYS: 52.5 CM/S

## 2022-10-12 PROBLEM — M22.2X2 PATELLOFEMORAL PAIN SYNDROME OF LEFT KNEE: Status: ACTIVE | Noted: 2018-02-11

## 2022-10-12 PROBLEM — M70.52 PATELLAR BURSITIS OF LEFT KNEE: Status: ACTIVE | Noted: 2018-02-11

## 2022-10-18 ENCOUNTER — OFFICE VISIT (OUTPATIENT)
Dept: CARDIOLOGY CLINIC | Age: 67
End: 2022-10-18
Payer: MEDICARE

## 2022-10-18 VITALS
WEIGHT: 173.8 LBS | RESPIRATION RATE: 17 BRPM | SYSTOLIC BLOOD PRESSURE: 126 MMHG | HEIGHT: 71 IN | DIASTOLIC BLOOD PRESSURE: 60 MMHG | OXYGEN SATURATION: 97 % | HEART RATE: 71 BPM | BODY MASS INDEX: 24.33 KG/M2

## 2022-10-18 DIAGNOSIS — R93.1 ELEVATED CORONARY ARTERY CALCIUM SCORE: Primary | ICD-10-CM

## 2022-10-18 DIAGNOSIS — E78.00 HYPERCHOLESTEREMIA: ICD-10-CM

## 2022-10-18 DIAGNOSIS — Z82.49 FAMILY HISTORY OF PREMATURE CAD: ICD-10-CM

## 2022-10-18 PROCEDURE — G8432 DEP SCR NOT DOC, RNG: HCPCS | Performed by: SPECIALIST

## 2022-10-18 PROCEDURE — G8536 NO DOC ELDER MAL SCRN: HCPCS | Performed by: SPECIALIST

## 2022-10-18 PROCEDURE — G8420 CALC BMI NORM PARAMETERS: HCPCS | Performed by: SPECIALIST

## 2022-10-18 PROCEDURE — 1123F ACP DISCUSS/DSCN MKR DOCD: CPT | Performed by: SPECIALIST

## 2022-10-18 PROCEDURE — G8427 DOCREV CUR MEDS BY ELIG CLIN: HCPCS | Performed by: SPECIALIST

## 2022-10-18 PROCEDURE — 99213 OFFICE O/P EST LOW 20 MIN: CPT | Performed by: SPECIALIST

## 2022-10-18 PROCEDURE — 1101F PT FALLS ASSESS-DOCD LE1/YR: CPT | Performed by: SPECIALIST

## 2022-10-18 PROCEDURE — 3017F COLORECTAL CA SCREEN DOC REV: CPT | Performed by: SPECIALIST

## 2022-10-18 RX ORDER — EZETIMIBE 10 MG/1
10 TABLET ORAL DAILY
Qty: 90 TABLET | Refills: 3 | Status: SHIPPED | OUTPATIENT
Start: 2022-10-18

## 2022-10-18 NOTE — PROGRESS NOTES
HISTORY OF PRESENT ILLNESS  Endy Mckay is a 73 y.o. male     SUMMARY:   Problem List  Date Reviewed: 10/17/2022            Codes Class Noted    Hypercholesteremia ICD-10-CM: E78.00  ICD-9-CM: 272.0  3/21/2018    Overview Signed 3/21/2018 10:08 AM by Robin Montes MD     12/08 normal dobutamine cardiolyte stress test, lvef 59%             Patellar bursitis of left knee ICD-10-CM: M70.52  ICD-9-CM: 726.60  2/11/2018        Patellofemoral pain syndrome of left knee ICD-10-CM: M22.2X2  ICD-9-CM: 719.46  2/11/2018           Current Outpatient Medications on File Prior to Visit   Medication Sig    azithromycin (ZITHROMAX) 250 mg tablet As dir    rosuvastatin (CRESTOR) 40 mg tablet TAKE 1 TABLET BY MOUTH EVERY DAY. Need fasting labs please. omeprazole (PRILOSEC) 20 mg capsule TAKE 1 CAPSULE BY MOUTH TWICE DAILY    magnesium 250 mg tab Take 250 mg by mouth daily. No current facility-administered medications on file prior to visit. CARDIOLOGY STUDIES TO DATE:  12/08 normal dobutamine cardiolyte stress test, lvef 59%   3/18 calcium score 482, 77th percentile   4/18 normal stress echo  2016 carotid dopplers mild bilateral plaque  7/19 carotid dopplers unchanged    9/21 normal stress echo    Chief Complaint   Patient presents with    Follow-up     HPI :  He is doing well with no cardiac complaints. He does stretching plus walks on the elliptical almost every day without any worrisome symptoms. His most recent LDL cholesterol was just a little over 100.   They are thinking about taking trip to [facility] in February  CARDIAC ROS:   negative for chest pain, dyspnea, palpitations, syncope, orthopnea, paroxysmal nocturnal dyspnea, exertional chest pressure/discomfort, claudication, lower extremity edema    Family History   Problem Relation Age of Onset    Heart Disease Mother     Heart Disease Father     Heart Disease Brother         premature    Heart Disease Brother         premature       Past Medical History:   Diagnosis Date    Atypical chest pain     Hypercholesteremia     Hypertension     Reflux esophagitis        GENERAL ROS:  A comprehensive review of systems was negative except for that written in the HPI.     Visit Vitals  /60 (BP 1 Location: Left upper arm, BP Patient Position: Sitting, BP Cuff Size: Small adult)   Pulse 71   Resp 17   Ht 5' 11\" (1.803 m)   Wt 173 lb 12.8 oz (78.8 kg)   SpO2 97%   BMI 24.24 kg/m²       Wt Readings from Last 3 Encounters:   10/18/22 173 lb 12.8 oz (78.8 kg)   10/12/22 172 lb (78 kg)   09/21/21 171 lb (77.6 kg)            BP Readings from Last 3 Encounters:   10/18/22 126/60   10/12/22 132/72   08/10/21 138/62       PHYSICAL EXAM  General appearance: alert, cooperative, no distress, appears stated age  Neurologic: Alert and oriented X 3  Neck: supple, symmetrical, trachea midline, no adenopathy, no carotid bruit, and no JVD  Lungs: clear to auscultation bilaterally  Heart: regular rate and rhythm, S1, S2 normal, no murmur, click, rub or gallop  Extremities: extremities normal, atraumatic, no cyanosis or edema    Lab Results   Component Value Date/Time    Cholesterol, total 184 10/12/2022 10:16 AM    Cholesterol, total 186 08/04/2021 02:20 PM    Cholesterol, total 179 07/08/2020 08:56 AM    Cholesterol, total 176 05/08/2018 09:12 AM    Cholesterol, total 220 (H) 12/29/2015 02:01 PM    HDL Cholesterol 71 10/12/2022 10:16 AM    HDL Cholesterol 80 08/04/2021 02:20 PM    HDL Cholesterol 69 07/08/2020 08:56 AM    HDL Cholesterol 64 05/08/2018 09:12 AM    HDL Cholesterol 72 12/29/2015 02:01 PM    LDL, calculated 101 (H) 10/12/2022 10:16 AM    LDL, calculated 95 08/04/2021 02:20 PM    LDL, calculated 97 07/08/2020 08:56 AM    LDL, calculated 100 (H) 05/08/2018 09:12 AM    LDL, calculated 136 (H) 12/29/2015 02:01 PM    LDL, calculated 95 06/19/2013 02:41 PM    Triglyceride 61 10/12/2022 10:16 AM    Triglyceride 60 08/04/2021 02:20 PM    Triglyceride 64 07/08/2020 08:56 AM    Triglyceride 62 05/08/2018 09:12 AM    Triglyceride 59 12/29/2015 02:01 PM     ASSESSMENT :      He is stable he is stable and asymptomatic at this point. Given his family history and an elevated calcium score I think we should try to push his LDL down a little bit more. He is already on full dose Crestor sort may add Zetia 10 mg.  current treatment plan is effective, no change in therapy  lab results and schedule of future lab studies reviewed with patient  reviewed diet, exercise and weight control    Encounter Diagnoses   Name Primary? Elevated coronary artery calcium score Yes    Hypercholesteremia     Family history of premature CAD      No orders of the defined types were placed in this encounter. Follow-up and Dispositions    Return in about 1 year (around 10/18/2023). Marie Daley MD  10/18/2022  Please note that this dictation was completed with [employer], the computer voice recognition software. Quite often unanticipated grammatical, syntax, homophones, and other interpretive errors are inadvertently transcribed by the computer software. Please disregard these errors. Please excuse any errors that have escaped final proofreading. Thank you.

## 2022-10-18 NOTE — PROGRESS NOTES
Rm    No chief complaint on file. Visit Vitals  /60 (BP 1 Location: Left upper arm, BP Patient Position: Sitting, BP Cuff Size: Small adult)   Pulse 71   Resp 17   Ht 5' 11\" (1.803 m)   Wt 173 lb 12.8 oz (78.8 kg)   SpO2 97%   BMI 24.24 kg/m²        1. Have you been to the ER, urgent care clinic since your last visit? Hospitalized since your last visit? No    2. Have you seen or consulted any other health care providers outside of the [address] [place] [address] since your last visit? Include any pap smears or colon screening. No     Health Maintenance Due   Topic Date Due    Shingrix Vaccine Age 49> (1 of 2) Never done    COVID-19 Vaccine (3 - Booster for Moderna series) 08/30/2021    Pneumococcal 65+ years (1 - PCV) Never done    Flu Vaccine (1) 08/01/2022    Depression Screen  10/05/2022        3 most recent PHQ Screens 10/18/2022   Little interest or pleasure in doing things Not at all   Feeling down, depressed, irritable, or hopeless -   Total Score PHQ 2 -        Fall Risk Assessment, last 12 mths 10/18/2022   Able to walk? Yes   Fall in past 12 months? 0   Do you feel unsteady?  0   Are you worried about falling 0       Learning Assessment 4/21/2021   PRIMARY LEARNER Patient   PRIMARY LANGUAGE ENGLISH   LEARNER PREFERENCE PRIMARY DEMONSTRATION   ANSWERED BY Patient   RELATIONSHIP SELF

## 2022-11-02 DIAGNOSIS — E78.00 HYPERCHOLESTEREMIA: ICD-10-CM

## 2022-11-02 DIAGNOSIS — Z82.49 FAMILY HISTORY OF PREMATURE CAD: ICD-10-CM

## 2022-11-02 RX ORDER — ROSUVASTATIN CALCIUM 40 MG/1
TABLET, COATED ORAL
Qty: 30 TABLET | Refills: 0 | Status: SHIPPED | OUTPATIENT
Start: 2022-11-02

## 2022-11-02 NOTE — TELEPHONE ENCOUNTER
Requested Prescriptions     Signed Prescriptions Disp Refills    rosuvastatin (CRESTOR) 40 mg tablet 30 Tablet 0     Sig: TAKE 1 TABLET BY MOUTH EVERY DAY. NEED FASTING LABS PLEASE.      Authorizing Provider: Joshua Mcknight     Ordering User: Ulisses Hunt    Per Dr. Jovana Vaca verbal orders

## 2022-12-07 DIAGNOSIS — Z82.49 FAMILY HISTORY OF PREMATURE CAD: ICD-10-CM

## 2022-12-07 DIAGNOSIS — E78.00 HYPERCHOLESTEREMIA: ICD-10-CM

## 2022-12-07 RX ORDER — ROSUVASTATIN CALCIUM 40 MG/1
40 TABLET, COATED ORAL
Qty: 30 TABLET | Refills: 11 | Status: SHIPPED | OUTPATIENT
Start: 2022-12-07

## 2022-12-07 NOTE — TELEPHONE ENCOUNTER
Requested Prescriptions     Signed Prescriptions Disp Refills    rosuvastatin (CRESTOR) 40 mg tablet 30 Tablet 11     Sig: Take 1 Tablet by mouth nightly. TAKE 1 TABLET BY MOUTH EVERY DAY.      Authorizing Provider: Lakeisha Infante     Ordering User: Temitope Maya    Per Dr. Toyin Tapia verbal orders     Future Appointments   Date Time Provider Ninfa Juani   10/18/2023  3:00 PM MD MIHAELA Araujo AMB

## 2022-12-29 ENCOUNTER — TELEPHONE (OUTPATIENT)
Dept: CARDIOLOGY CLINIC | Age: 67
End: 2022-12-29

## 2022-12-29 DIAGNOSIS — E78.00 HYPERCHOLESTEREMIA: ICD-10-CM

## 2022-12-29 RX ORDER — EZETIMIBE 10 MG/1
10 TABLET ORAL DAILY
Qty: 90 TABLET | Refills: 3 | Status: SHIPPED | OUTPATIENT
Start: 2022-12-29

## 2022-12-29 NOTE — TELEPHONE ENCOUNTER
Patient is calling to see if he would be able to get the medication Zetia 10mg filled at [facility] so [facility]. Please Advise.      Pharmacy Verified     [facility]     [id number] [facility] [address]    [phone]    [facility] $18.54

## 2022-12-29 NOTE — TELEPHONE ENCOUNTER
Requested Prescriptions     Signed Prescriptions Disp Refills    ezetimibe (ZETIA) 10 mg tablet 90 Tablet 3     Sig: Take 1 Tablet by mouth daily.      Authorizing Provider: Michel Trevino     Ordering User: Melinda Noble    Per Dr. Lesley Light verbal orders

## 2023-01-03 ENCOUNTER — TELEPHONE (OUTPATIENT)
Dept: CARDIOLOGY CLINIC | Age: 68
End: 2023-01-03

## 2023-01-03 DIAGNOSIS — E78.00 HYPERCHOLESTEREMIA: ICD-10-CM

## 2023-01-03 NOTE — TELEPHONE ENCOUNTER
Pt stated there is no pharmacy at the [facility], where his previous prescription was sent to. Pt is rq a new refill for his Zetia to be sent to the pharmacy below.      [facility] [address], [facility] [address] [facility] #[phone]    [facility] Coupon  - [facility] $18.54

## 2023-01-04 NOTE — TELEPHONE ENCOUNTER
Patient is trying to get a prescription for his Zetia. The pharmacy is [facility]. Just wants this medicine to go to this pharmacy.       [facility] [address],   [address] [facility]   [phone]

## 2023-01-04 NOTE — TELEPHONE ENCOUNTER
Called [facility]. Mulugeta Mercado in Customer Service let me know there is not a pharmacy at that store. Called pt. SHIRLEY on VM stating the [facility] store he wanted us to send rx to does not have a pharmacy. Requested call back to give us a different pharmacy or said he can have a pharmacy reach out to us.

## 2023-01-11 RX ORDER — EZETIMIBE 10 MG/1
10 TABLET ORAL DAILY
Qty: 90 TABLET | Refills: 3 | Status: SHIPPED | OUTPATIENT
Start: 2023-01-11

## 2023-01-30 ENCOUNTER — TELEPHONE (OUTPATIENT)
Dept: CARDIOLOGY CLINIC | Age: 68
End: 2023-01-30

## 2023-01-30 NOTE — TELEPHONE ENCOUNTER
Patient is calling because he would like to know if the medication Zetia causes a rash because he has broken out in a rash in the last 2 weeks.     [phone]

## 2023-01-30 NOTE — TELEPHONE ENCOUNTER
Patient called. Verified patient's identity with two identifiers. Patient stated he has had a rash surrounding anal area for about 2-3 weeks that has gotten very bad. He has seen a dermatologist that has prescribed creams and ointments, but they are not completely working to resolve issue. He wants to try stopping zetia to see if that helps. I told him I have not heard of these sxs caused by zetia, but no harm in holding zetia a few days to see if it helps. Patient stated he already held a dose last night, will stay off it for about 1 week, then call or send [facility] message in about 1 week. Patient verbalized understanding and denied further questions or concerns.

## 2023-11-08 ENCOUNTER — OFFICE VISIT (OUTPATIENT)
Age: 68
End: 2023-11-08
Payer: MEDICARE

## 2023-11-08 VITALS
OXYGEN SATURATION: 99 % | BODY MASS INDEX: 23.63 KG/M2 | HEIGHT: 71 IN | WEIGHT: 168.8 LBS | HEART RATE: 85 BPM | SYSTOLIC BLOOD PRESSURE: 148 MMHG | DIASTOLIC BLOOD PRESSURE: 76 MMHG

## 2023-11-08 DIAGNOSIS — E78.00 HYPERCHOLESTEREMIA: ICD-10-CM

## 2023-11-08 DIAGNOSIS — Z82.49 FAMILY HISTORY OF PREMATURE CAD: ICD-10-CM

## 2023-11-08 DIAGNOSIS — R09.89 LEFT CAROTID BRUIT: ICD-10-CM

## 2023-11-08 DIAGNOSIS — R93.1 AGATSTON CORONARY ARTERY CALCIUM SCORE GREATER THAN 400: Primary | ICD-10-CM

## 2023-11-08 PROCEDURE — 99213 OFFICE O/P EST LOW 20 MIN: CPT | Performed by: SPECIALIST

## 2023-11-08 PROCEDURE — 1123F ACP DISCUSS/DSCN MKR DOCD: CPT | Performed by: SPECIALIST

## 2023-11-08 PROCEDURE — G8427 DOCREV CUR MEDS BY ELIG CLIN: HCPCS | Performed by: SPECIALIST

## 2023-11-08 PROCEDURE — 1036F TOBACCO NON-USER: CPT | Performed by: SPECIALIST

## 2023-11-08 PROCEDURE — 3017F COLORECTAL CA SCREEN DOC REV: CPT | Performed by: SPECIALIST

## 2023-11-08 PROCEDURE — G8420 CALC BMI NORM PARAMETERS: HCPCS | Performed by: SPECIALIST

## 2023-11-08 PROCEDURE — G8484 FLU IMMUNIZE NO ADMIN: HCPCS | Performed by: SPECIALIST

## 2023-11-08 ASSESSMENT — PATIENT HEALTH QUESTIONNAIRE - PHQ9
SUM OF ALL RESPONSES TO PHQ QUESTIONS 1-9: 0
SUM OF ALL RESPONSES TO PHQ9 QUESTIONS 1 & 2: 0
1. LITTLE INTEREST OR PLEASURE IN DOING THINGS: 0
SUM OF ALL RESPONSES TO PHQ QUESTIONS 1-9: 0
2. FEELING DOWN, DEPRESSED OR HOPELESS: 0

## 2023-11-10 ENCOUNTER — HOSPITAL ENCOUNTER (OUTPATIENT)
Age: 68
End: 2023-11-10
Payer: MEDICARE

## 2023-11-10 DIAGNOSIS — M41.25 OTHER IDIOPATHIC SCOLIOSIS, THORACOLUMBAR REGION: ICD-10-CM

## 2023-11-10 DIAGNOSIS — M81.0 AGE-RELATED OSTEOPOROSIS WITHOUT CURRENT PATHOLOGICAL FRACTURE: ICD-10-CM

## 2023-11-10 PROCEDURE — 77080 DXA BONE DENSITY AXIAL: CPT

## 2023-12-13 DIAGNOSIS — E78.00 PURE HYPERCHOLESTEROLEMIA, UNSPECIFIED: ICD-10-CM

## 2023-12-13 DIAGNOSIS — Z82.49 FAMILY HISTORY OF ISCHEMIC HEART DISEASE AND OTHER DISEASES OF THE CIRCULATORY SYSTEM: ICD-10-CM

## 2023-12-13 RX ORDER — ROSUVASTATIN CALCIUM 40 MG/1
40 TABLET, COATED ORAL NIGHTLY
Qty: 90 TABLET | Refills: 3 | Status: SHIPPED | OUTPATIENT
Start: 2023-12-13

## 2023-12-13 NOTE — TELEPHONE ENCOUNTER
Requested Prescriptions     Signed Prescriptions Disp Refills    rosuvastatin (CRESTOR) 40 MG tablet 90 tablet 3     Sig: TAKE 1 TABLET BY MOUTH NIGHTLY. Authorizing Provider: Gail Tripp     Ordering User: Iram Dela Cruz    Per Dr. Merline Jensen verbal order.

## 2024-01-04 ENCOUNTER — TELEPHONE (OUTPATIENT)
Age: 69
End: 2024-01-04

## 2024-01-04 NOTE — TELEPHONE ENCOUNTER
Called pt. STACIE on VM stating if his question was just when the certain medication was started, it was started on 10/18/22 at office visit with Dr. Denson. I stated he can call back with any further questions.

## 2024-01-04 NOTE — TELEPHONE ENCOUNTER
Patient called ? When he first received medication Ezetimibe would like callback ASAP    Patient# [phone]

## 2024-01-29 DIAGNOSIS — E78.00 PURE HYPERCHOLESTEROLEMIA, UNSPECIFIED: ICD-10-CM

## 2024-01-29 RX ORDER — EZETIMIBE 10 MG/1
10 TABLET ORAL DAILY
Qty: 90 TABLET | Refills: 3 | Status: SHIPPED | OUTPATIENT
Start: 2024-01-29

## 2024-01-29 NOTE — TELEPHONE ENCOUNTER
Per VO of MD    LOV: 11/8/2023     Future Appointments   Date Time Provider Department Center   11/1/2024  8:40 AM Magdiel Denson III, MD [facility]       Requested Prescriptions     Signed Prescriptions Disp Refills    ezetimibe (ZETIA) 10 MG tablet 90 tablet 3     Sig: TAKE 1 TABLET BY MOUTH EVERY DAY     Authorizing Provider: MAGDIEL DENSON III     Ordering User: HUNTER RAZA

## 2024-12-26 DIAGNOSIS — E78.00 PURE HYPERCHOLESTEROLEMIA, UNSPECIFIED: ICD-10-CM

## 2024-12-26 DIAGNOSIS — Z82.49 FAMILY HISTORY OF ISCHEMIC HEART DISEASE AND OTHER DISEASES OF THE CIRCULATORY SYSTEM: ICD-10-CM

## 2024-12-26 RX ORDER — ROSUVASTATIN CALCIUM 40 MG/1
40 TABLET, COATED ORAL NIGHTLY
Qty: 90 TABLET | Refills: 1 | Status: SHIPPED | OUTPATIENT
Start: 2024-12-26

## 2024-12-26 NOTE — TELEPHONE ENCOUNTER
Refill Request Received for the Following Medication     Requested Prescriptions     Pending Prescriptions Disp Refills    rosuvastatin (CRESTOR) 40 MG tablet [Pharmacy Med Name: ROSUVASTATIN CALCIUM 40 MG TAB] 90 tablet 3     Sig: TAKE 1 TABLET BY MOUTH EVERY DAY AT NIGHT       Last Prescribed: 12-    Last Appointment With Me:  11/8/2023     Future Appointments:  Future Appointments   Date Time Provider Department Center   1/13/2025  3:20 PM Magdiel Denson III, MD [facility]

## 2025-01-13 ENCOUNTER — OFFICE VISIT (OUTPATIENT)
Age: 70
End: 2025-01-13
Payer: MEDICARE

## 2025-01-13 VITALS
HEIGHT: 71 IN | OXYGEN SATURATION: 99 % | HEART RATE: 74 BPM | SYSTOLIC BLOOD PRESSURE: 134 MMHG | DIASTOLIC BLOOD PRESSURE: 80 MMHG | BODY MASS INDEX: 23.94 KG/M2 | WEIGHT: 171 LBS

## 2025-01-13 DIAGNOSIS — R09.89 LEFT CAROTID BRUIT: ICD-10-CM

## 2025-01-13 DIAGNOSIS — E78.2 MIXED HYPERLIPIDEMIA: ICD-10-CM

## 2025-01-13 DIAGNOSIS — R93.1 AGATSTON CORONARY ARTERY CALCIUM SCORE GREATER THAN 400: Primary | ICD-10-CM

## 2025-01-13 DIAGNOSIS — Z82.49 FAMILY HISTORY OF ISCHEMIC HEART DISEASE AND OTHER DISEASES OF THE CIRCULATORY SYSTEM: ICD-10-CM

## 2025-01-13 DIAGNOSIS — R03.0 WHITE COAT SYNDROME WITHOUT DIAGNOSIS OF HYPERTENSION: ICD-10-CM

## 2025-01-13 PROCEDURE — 1159F MED LIST DOCD IN RCRD: CPT | Performed by: SPECIALIST

## 2025-01-13 PROCEDURE — 3017F COLORECTAL CA SCREEN DOC REV: CPT | Performed by: SPECIALIST

## 2025-01-13 PROCEDURE — 1160F RVW MEDS BY RX/DR IN RCRD: CPT | Performed by: SPECIALIST

## 2025-01-13 PROCEDURE — G8427 DOCREV CUR MEDS BY ELIG CLIN: HCPCS | Performed by: SPECIALIST

## 2025-01-13 PROCEDURE — 1123F ACP DISCUSS/DSCN MKR DOCD: CPT | Performed by: SPECIALIST

## 2025-01-13 PROCEDURE — 1036F TOBACCO NON-USER: CPT | Performed by: SPECIALIST

## 2025-01-13 PROCEDURE — 99214 OFFICE O/P EST MOD 30 MIN: CPT | Performed by: SPECIALIST

## 2025-01-13 PROCEDURE — G8420 CALC BMI NORM PARAMETERS: HCPCS | Performed by: SPECIALIST

## 2025-01-13 PROCEDURE — 1126F AMNT PAIN NOTED NONE PRSNT: CPT | Performed by: SPECIALIST

## 2025-01-13 RX ORDER — PANTOPRAZOLE SODIUM 40 MG/1
40 TABLET, DELAYED RELEASE ORAL 2 TIMES DAILY
COMMUNITY

## 2025-01-13 ASSESSMENT — PATIENT HEALTH QUESTIONNAIRE - PHQ9
SUM OF ALL RESPONSES TO PHQ QUESTIONS 1-9: 0
SUM OF ALL RESPONSES TO PHQ9 QUESTIONS 1 & 2: 0
1. LITTLE INTEREST OR PLEASURE IN DOING THINGS: NOT AT ALL
SUM OF ALL RESPONSES TO PHQ QUESTIONS 1-9: 0
2. FEELING DOWN, DEPRESSED OR HOPELESS: NOT AT ALL

## 2025-01-13 NOTE — PROGRESS NOTES
HISTORY OF PRESENT ILLNESS  Jaylan Eason is a 69 y.o. male     SUMMARY:   Patient Active Problem List   Diagnosis    Hypercholesteremia    Patellar bursitis of left knee    Patellofemoral pain syndrome of left knee    Agatston coronary artery calcium score greater than 400    Family history of ischemic heart disease and other diseases of the circulatory system            CARDIOLOGY STUDIES TO DATE:  12/08 normal dobutamine cardiolyte stress test, lvef 59%   3/18 calcium score 482, 77th percentile   4/18 normal stress echo  2016 carotid dopplers mild bilateral plaque  7/19 carotid dopplers unchanged     9/21 normal stress echo  9/22, [facility], carotid dopplers unchanged    Chief Complaint   Patient presents with    Hyperlipidemia       HPI :  He is doing great with no cardiac complaints or problems with his medications.  He is still exercising regularly without any worrisome symptoms.  Blood pressure is excellent and his weight is stable.  Most recent lipid profile looked outstanding.  He is now on Zetia in addition to Crestor and his HDL is actually higher than his LDL.  They are going to [facility] in June for a wedding.    CARDIAC ROS:   negative for chest pain, claudication, dyspnea, irregular heart beat, lower extremity edema, orthopnea, paroxysmal nocturnal dyspnea, and syncope    Family History   Problem Relation Age of Onset    Heart Disease Brother         premature    Heart Disease Mother     Heart Disease Father     Heart Disease Brother         premature       Past Medical History:   Diagnosis Date    Atypical chest pain     Hypercholesteremia     Reflux esophagitis        Specialty Problems          Cardiology Problems    Hypercholesteremia        Agatston coronary artery calcium score greater than 400            GENERAL ROS:  A comprehensive review of systems was negative except for what was noted in the HPI.  /80 (Site: Right Upper Arm, Position: Sitting, Cuff Size: Medium Adult)

## 2025-01-13 NOTE — PROGRESS NOTES
1. Have you been to the ER, urgent care clinic since your last visit?  Hospitalized since your last visit?No    2. Have you seen or consulted any other health care providers outside of the [facility] since your last visit?  Include any pap smears or colon screening. No

## 2025-01-25 DIAGNOSIS — E78.00 PURE HYPERCHOLESTEROLEMIA, UNSPECIFIED: ICD-10-CM

## 2025-01-27 RX ORDER — EZETIMIBE 10 MG/1
10 TABLET ORAL DAILY
Qty: 90 TABLET | Refills: 3 | Status: SHIPPED | OUTPATIENT
Start: 2025-01-27

## 2025-01-27 NOTE — TELEPHONE ENCOUNTER
PCP: Emir Ly MD    Last appt: 1/13/2025   Future Appointments   Date Time Provider Department Center   1/23/2026 11:40 AM Magdiel Oviedo III, MD [facility]       Requested Prescriptions     Signed Prescriptions Disp Refills    ezetimibe (ZETIA) 10 MG tablet 90 tablet 3     Sig: TAKE 1 TABLET BY MOUTH EVERY DAY     Authorizing Provider: MAGDIEL OVIEDO III     Ordering User: RHAEEL EDMOND         Other Comments:  Verbal order per provider.  Order (medication, dose, route, frequency, amount, refills) repeated and verified twice.

## 2025-06-22 DIAGNOSIS — Z82.49 FAMILY HISTORY OF ISCHEMIC HEART DISEASE AND OTHER DISEASES OF THE CIRCULATORY SYSTEM: ICD-10-CM

## 2025-06-22 DIAGNOSIS — E78.00 PURE HYPERCHOLESTEROLEMIA, UNSPECIFIED: ICD-10-CM

## 2025-06-23 RX ORDER — ROSUVASTATIN CALCIUM 40 MG/1
40 TABLET, COATED ORAL NIGHTLY
Qty: 90 TABLET | Refills: 2 | Status: SHIPPED | OUTPATIENT
Start: 2025-06-23

## 2025-06-23 NOTE — TELEPHONE ENCOUNTER
Requested Prescriptions     Signed Prescriptions Disp Refills    rosuvastatin (CRESTOR) 40 MG tablet 90 tablet 2     Sig: TAKE 1 TABLET BY MOUTH EVERY DAY AT NIGHT     Authorizing Provider: ARCHANA DENSON III     Ordering User: SHOBHA MCLAUGHLIN      Future Appointments   Date Time Provider Department Center   1/23/2026 11:40 AM Archana Denson III, MD [facility]      Per Verbal Order by MD/NP